# Patient Record
Sex: FEMALE | Race: WHITE
[De-identification: names, ages, dates, MRNs, and addresses within clinical notes are randomized per-mention and may not be internally consistent; named-entity substitution may affect disease eponyms.]

---

## 2020-06-06 ENCOUNTER — HOSPITAL ENCOUNTER (EMERGENCY)
Dept: HOSPITAL 97 - ER | Age: 45
Discharge: HOME | End: 2020-06-06
Payer: COMMERCIAL

## 2020-06-06 VITALS — OXYGEN SATURATION: 98 %

## 2020-06-06 VITALS — TEMPERATURE: 99 F

## 2020-06-06 VITALS — SYSTOLIC BLOOD PRESSURE: 178 MMHG | DIASTOLIC BLOOD PRESSURE: 101 MMHG

## 2020-06-06 DIAGNOSIS — Z88.2: ICD-10-CM

## 2020-06-06 DIAGNOSIS — I10: Primary | ICD-10-CM

## 2020-06-06 DIAGNOSIS — Z88.0: ICD-10-CM

## 2020-06-06 LAB
ALBUMIN SERPL BCP-MCNC: 4 G/DL (ref 3.4–5)
ALP SERPL-CCNC: 73 U/L (ref 45–117)
ALT SERPL W P-5'-P-CCNC: 28 U/L (ref 12–78)
AST SERPL W P-5'-P-CCNC: 19 U/L (ref 15–37)
BUN BLD-MCNC: 10 MG/DL (ref 7–18)
GLUCOSE SERPLBLD-MCNC: 97 MG/DL (ref 74–106)
HCT VFR BLD CALC: 45.3 % (ref 36–45)
INR BLD: 0.97
LYMPHOCYTES # SPEC AUTO: 1.6 K/UL (ref 0.7–4.9)
MAGNESIUM SERPL-MCNC: 2 MG/DL (ref 1.8–2.4)
NT-PROBNP SERPL-MCNC: 48 PG/ML (ref ?–125)
PMV BLD: 10.2 FL (ref 7.6–11.3)
POTASSIUM SERPL-SCNC: 3.6 MMOL/L (ref 3.5–5.1)
RBC # BLD: 5.03 M/UL (ref 3.86–4.86)
TROPONIN (EMERG DEPT USE ONLY): < 0.02 NG/ML (ref 0–0.04)
TSH SERPL DL<=0.05 MIU/L-ACNC: 3.26 UIU/ML (ref 0.36–3.74)

## 2020-06-06 PROCEDURE — 99284 EMERGENCY DEPT VISIT MOD MDM: CPT

## 2020-06-06 PROCEDURE — 84703 CHORIONIC GONADOTROPIN ASSAY: CPT

## 2020-06-06 PROCEDURE — 83880 ASSAY OF NATRIURETIC PEPTIDE: CPT

## 2020-06-06 PROCEDURE — 87804 INFLUENZA ASSAY W/OPTIC: CPT

## 2020-06-06 PROCEDURE — 93005 ELECTROCARDIOGRAM TRACING: CPT

## 2020-06-06 PROCEDURE — 81003 URINALYSIS AUTO W/O SCOPE: CPT

## 2020-06-06 PROCEDURE — 85025 COMPLETE CBC W/AUTO DIFF WBC: CPT

## 2020-06-06 PROCEDURE — 71045 X-RAY EXAM CHEST 1 VIEW: CPT

## 2020-06-06 PROCEDURE — 80048 BASIC METABOLIC PNL TOTAL CA: CPT

## 2020-06-06 PROCEDURE — 84443 ASSAY THYROID STIM HORMONE: CPT

## 2020-06-06 PROCEDURE — 81025 URINE PREGNANCY TEST: CPT

## 2020-06-06 PROCEDURE — 84484 ASSAY OF TROPONIN QUANT: CPT

## 2020-06-06 PROCEDURE — 83735 ASSAY OF MAGNESIUM: CPT

## 2020-06-06 PROCEDURE — 80076 HEPATIC FUNCTION PANEL: CPT

## 2020-06-06 PROCEDURE — 85379 FIBRIN DEGRADATION QUANT: CPT

## 2020-06-06 PROCEDURE — 36415 COLL VENOUS BLD VENIPUNCTURE: CPT

## 2020-06-06 PROCEDURE — 85610 PROTHROMBIN TIME: CPT

## 2020-06-06 NOTE — EDPHYS
Physician Documentation                                                                           

 [facility]                                                                 

Name: Kamala Lee                                                                                

Age: 44 yrs                                                                                       

Sex: Female                                                                                       

: 1975                                                                                   

MRN: [id number]                                                                                   

Arrival Date: 2020                                                                          

Time: 18:29                                                                                       

Account#: [id number]                                                                            

Bed 2                                                                                             

Private MD: None, None                                                                            

ED Physician Pravin Ramires                                                                      

HPI:                                                                                              

                                                                                             

19:19 This 44 yrs old  Female presents to ER via Ambulatory with complaints of High  mh7 

      Blood Pressure.                                                                             

19:19 The patient has elevated blood pressure and discovered this at home, with a home        mh7 

      device. Onset: The symptoms/episode began/occurred 2 day(s) ago. Modifying factors: The     

      symptoms are aggravated by Nothing, The symptoms are alleviated by Nothing. Associated      

      signs and symptoms: Pertinent positives: Palpitations, Pertinent negatives: chest pain,     

      dizziness, dyspnea, headache, lightheadedness, nausea, visual changes, vomiting,            

      weakness. Severity of symptoms: At its worst the blood pressure was 170 mm Hg, in the       

      emergency department the blood pressure is are actually worse, 195 mm Hg. The patient       

      has experienced similar episodes in the past, a few times.                                  

                                                                                                  

OB/GYN:                                                                                           

22:36 LMP 2020                                                                              rv  

                                                                                                  

Historical:                                                                                       

- Allergies:                                                                                      

18:39 PENICILLINS;                                                                            ll1 

18:39 Sulfa (Sulfonamide Antibiotics);                                                        ll1 

- PMHx:                                                                                           

18:39 high blood pressure during pregnancy;                                                   ll1 

- PSHx:                                                                                           

18:39 None;                                                                                   ll1 

                                                                                                  

- Immunization history:: Adult Immunizations up to date.                                          

- Social history:: Patient/guardian denies using alcohol, street drugs, tobacco                   

  products, Smoking status: unknown.                                                              

                                                                                                  

                                                                                                  

ROS:                                                                                              

19:19 Constitutional: Negative for fever, chills, and weight loss, Eyes: Negative for injury, mh7 

      pain, redness, and discharge, ENT: Negative for injury, pain, and discharge, Neck:          

      Negative for injury, pain, and swelling, Respiratory: Negative for shortness of breath,     

      cough, wheezing, and pleuritic chest pain, Abdomen/GI: Negative for abdominal pain,         

      nausea, vomiting, diarrhea, and constipation, Back: Negative for injury and pain, :       

      Negative for injury, bleeding, discharge, and swelling, MS/Extremity: Negative for          

      injury and deformity, Skin: Negative for injury, rash, and discoloration, Neuro:            

      Negative for headache, weakness, numbness, tingling, and seizure, Psych: Negative for       

      depression, anxiety, suicide ideation, homicidal ideation, and hallucinations,              

      Allergy/Immunology: Negative for hives, rash, and allergies, Endocrine: Negative for        

      neck swelling, polydipsia, polyuria, polyphagia, and marked weight changes,                 

      Hematologic/Lymphatic: Negative for swollen nodes, abnormal bleeding, and unusual           

      bruising.                                                                                   

                                                                                                  

Exam:                                                                                             

19:19 Constitutional:  This is a well developed, well nourished patient who is awake, alert,  mh7 

      and in no acute distress. Head/Face:  Normocephalic, atraumatic. Eyes:  Pupils equal        

      round and reactive to light, extra-ocular motions intact.  Lids and lashes normal.          

      Conjunctiva and sclera are non-icteric and not injected.  Cornea within normal limits.      

      Periorbital areas with no swelling, redness, or edema. Neck:  Trachea midline, no           

      thyromegaly or masses palpated, and no cervical lymphadenopathy.  Supple, full range of     

      motion without nuchal rigidity, or vertebral point tenderness.  No Meningismus.             

      Chest/axilla:  Normal chest wall appearance and motion.  Nontender with no deformity.       

      No lesions are appreciated.                                                                 

19:19 Respiratory:  Lungs have equal breath sounds bilaterally, clear to auscultation and         

      percussion.  No rales, rhonchi or wheezes noted.  No increased work of breathing, no        

      retractions or nasal flaring. Abdomen/GI:  Soft, non-tender, with normal bowel sounds.      

      No distension or tympany.  No guarding or rebound.  No evidence of tenderness               

      throughout. Back:  No spinal tenderness.  No costovertebral tenderness.  Full range of      

      motion. Skin:  Warm, dry with normal turgor.  Normal color with no rashes, no lesions,      

      and no evidence of cellulitis. MS/ Extremity:  Pulses equal, no cyanosis.                   

      Neurovascular intact.  Full, normal range of motion. Neuro:  Awake and alert, GCS 15,       

      oriented to person, place, time, and situation.  Cranial nerves II-XII grossly intact.      

      Motor strength 5/5 in all extremities.  Sensory grossly intact.  Cerebellar exam            

      normal.  Normal gait. Psych:  Awake, alert, with orientation to person, place and time.     

       Behavior, mood, and affect are within normal limits.                                       

19:19 Cardiovascular: Rate: tachycardic, Rhythm: regular, Pulses: no pulse deficits are           

      appreciated, Heart sounds: normal, normal S1and S2, Edema: is not appreciated, JVD: is      

      not appreciated.                                                                            

19:54 ECG was reviewed by the Attending Physician.                                            [facility] 

                                                                                                  

Vital Signs:                                                                                      

18:37  / 118; Pulse 118; Resp 18; Temp 100.3; Pulse Ox 97% ;                            ll1 

20:00  / 109; Pulse 97; Resp 17; Pulse Ox 99% on R/A;                                   rv  

20:14  / 113; Pulse 105; Resp 16; Pulse Ox 99% on R/A;                                  rv  

20:59  / 110; Pulse 101; Resp 14; Pulse Ox 98% ;                                        rv  

21:03 Temp 99(O);                                                                             rv  

21:48  / 111 LA Sitting; Pulse 95; Resp 16; Pulse Ox 97% on R/A;                        rv  

21:48 Pulse 85;                                                                               rv  

21:55  / 109 RA Sitting; Pulse 85; Resp 16; Pulse Ox 98% ;                              rv  

22:36  / 101; Pulse 88; Resp 15 S; Pulse Ox 98% on R/A;                                 rv  

                                                                                                  

MDM:                                                                                              

19:14 Patient medically screened.                                                             [facility] 

22:18 Differential diagnosis: hypertensive crisis, Malignant HTN, Hypertension, Palpitations, [facility] 

      Hyperthyroidism, Anxiety. Data reviewed: vital signs, nurses notes, lab test result(s),     

      cardiac enzymes, CBC, electrolytes, Flu: urinalysis, EKG, radiologic studies, plain         

      films. Data interpreted: Cardiac monitor: rate is 85 beats/min, rhythm is normal sinus      

      rhythm, regular, Interpretation: normal rate, normal rhythm, Pulse oximetry: on room        

      air is 99 %. Interpretation: normal. Counseling: I had a detailed discussion with the       

      patient and/or guardian regarding: the historical points, exam findings, and any            

      diagnostic results supporting the discharge/admit diagnosis, the presence of at least       

      one elevated blood pressure reading (>120/80) during this emergency department visit,       

      lab results, radiology results. Response to treatment: the patient's symptoms have          

      markedly improved after treatment. Refusal of service: The patient/guardian displays        

      adequate decision making capability and despite a detailed discussion of alternatives,      

      benefits, risks, and consequences refuses: CT Scan.                                         

                                                                                                  

                                                                                             

19:15 Order name: Basic Metabolic Panel                                                       [facility] 

                                                                                             

19:15 Order name: CBC with Diff; Complete Time: 20:29                                         [facility] 

                                                                                             

19:15 Order name: LFT's; Complete Time: 20:47                                                 [facility] 

                                                                                             

19:15 Order name: Magnesium; Complete Time: 20:47                                             7 

                                                                                             

19:15 Order name: NT PRO-BNP; Complete Time: 20:47                                            7 

                                                                                             

19:15 Order name: PT-INR; Complete Time: 20:29                                                7 

                                                                                             

19:15 Order name: Troponin (emerg Dept Use Only); Complete Time: 20:47                        7 

                                                                                             

19:15 Order name: TSH; Complete Time: 20:47                                                   7 

                                                                                             

19:15 Order name: Pregnancy Test, Serum; Complete Time: 20:47                                 7 

                                                                                             

19:15 Order name: Influenza Screen (a \T\ B); Complete Time: 20:29                              7

                                                                                             

19:16 Order name: Basic Metabolic Panel; Complete Time: 20:47                                 EDMS

06                                                                                             

20:19 Order name: Urine Dipstick--Ancillary (enter results); Complete Time: 21:51             mw2 

06                                                                                             

20:19 Order name: Urine Pregnancy--Ancillary (enter results); Complete Time: 21:51            2 

06                                                                                             

21:13 Order name: D-Dimer; Complete Time: 21:51                                               rv  

06                                                                                             

19:15 Order name: XRAY Chest (1 view); Complete Time: 20:29                                   7 

                                                                                             

19:15 Order name: EKG; Complete Time: 19:16                                                   [facility] 

                                                                                             

19:15 Order name: Cardiac monitoring; Complete Time: 19:50                                    7 

                                                                                             

19:15 Order name: EKG - Nurse/Tech; Complete Time: 19:50                                      [facility] 

                                                                                             

19:15 Order name: IV Saline Lock; Complete Time: 19:50                                        [facility] 

                                                                                             

19:15 Order name: Labs collected and sent; Complete Time: 19:50                               7 

                                                                                             

19:15 Order name: O2 Per Protocol; Complete Time: 19:50                                       7 

                                                                                             

19:15 Order name: O2 Sat Monitoring; Complete Time: 19:50                                     [facility] 

                                                                                             

19:15 Order name: Urine Dipstick-Ancillary (obtain specimen); Complete Time: 20:32            mh7 

                                                                                                  

EC:54 Rate is 105 beats/min. Rhythm is regular, Sinus tachycardia. QRS is negative in leads   7 

      III, aVF, V1, V2, V3. ME interval is normal. QRS interval is normal. QT interval is         

      normal. No Q waves. T waves are Normal. No ST changes noted. Clinical impression: Sinus     

      tachycardia.                                                                                

                                                                                                  

Administered Medications:                                                                         

21:20 Drug: Metoprolol 25 mg Route: PO;                                                       rv  

21:48 Follow up: Pulse 85 bpm; Response: Cardiac rhythm changed                               rv  

                                                                                                  

                                                                                                  

Disposition:                                                                                      

20 22:22 Discharged to Home. Impression: Hypertension, Palpitations.                        

- Condition is Stable.                                                                            

- Discharge Instructions: Hypertension, Palpitations, Easy-to-Read.                               

- Prescriptions for Metoprolol Tartrate 25 mg Oral Tablet - take 1 tablet by ORAL route           

  2 times per day with a meal; 20 tablet.                                                         

- Medication Reconciliation Form, Thank You Letter, Antibiotic Education, Prescription            

  Opioid Use form.                                                                                

- Follow up: MOLLY Encinas MD; When: 1 - 2 days; Reason: Worsening of condition, Recheck               

  today's complaints.                                                                             

- Problem is new.                                                                                 

- Symptoms have improved.                                                                         

                                                                                                  

                                                                                                  

                                                                                                  

Signatures:                                                                                       

Dispatcher [employer]                           EDMS                                                 

Ede Ambrosio RN                    RN   rv                                                   

Dave Ramírez RN RN   1                                                  

Pravin Ramires MD MD   7                                                  

                                                                                                  

Corrections: (The following items were deleted from the chart)                                    

19:21 19:18 This 44 yrs old  Female presents to ER via Ambulatory with complaints of mh7 

      Palpitations. mh7                                                                           

22:37 22:22 2020 22:22 Discharged to Home. Impression: Hypertension; Palpitations.      rv  

      Condition is Stable. Forms are Medication Reconciliation Form, Thank You Letter,            

      Antibiotic Education, Prescription Opioid Use. Follow up: MOLLY Encinas; When: 1 - 2 days;         

      Reason: Worsening of condition, Recheck today's complaints. Problem is new. Symptoms        

      have improved. 7                                                                          

                                                                                                  

**************************************************************************************************

## 2020-06-06 NOTE — ER
Nurse's Notes                                                                                     

 [facility]                                                                 

Name: Kamala Lee                                                                                

Age: 44 yrs                                                                                       

Sex: Female                                                                                       

: 1975                                                                                   

MRN: [id number]                                                                                   

Arrival Date: 2020                                                                          

Time: 18:29                                                                                       

Account#: [id number]                                                                            

Bed 2                                                                                             

Private MD: None, None                                                                            

Diagnosis: Hypertension;Palpitations                                                              

                                                                                                  

Presentation:                                                                                     

                                                                                             

18:37 Chief complaint: Patient states: Palpitations since Thursday night. BP at home 190/120  ll1 

      today. Coronavirus screen: Proceed with normal triage. Patient denies a cough. Patient      

      denies shortness of breath or difficulty breathing. Patient denies measured and/or          

      subjective temperature greater than 100.4F prior to today's visit. Patient denies           

      travel on a cruise ship or to a country the [facility] currently lists as an affected area.        

      Patient denies contact with known and/or suspected case of COVID-19. Ebola Screen:          

      Patient denies travel to an Ebola-affected area in the 21 days before illness onset.        

      Initial Sepsis Screen: Does the patient meet any 2 criteria? HR > 90 bpm. No. Patient's     

      initial sepsis screen is negative. Risk Assessment: Do you want to hurt yourself or         

      someone else? Patient reports no desire to harm self or others. Onset of symptoms was       

      Kassi 3, 2020.                                                                               

18:37 Method Of Arrival: Ambulatory                                                           ll1 

18:37 Acuity: EMMIE 3                                                                           ll1 

19:52 Initial Sepsis Screen: Does the patient have a suspected source of infection? No.       rv  

      Patient's initial sepsis screen is negative.                                                

                                                                                                  

OB/GYN:                                                                                           

22:36 LMP 2020                                                                              rv  

                                                                                                  

Historical:                                                                                       

- Allergies:                                                                                      

18:39 PENICILLINS;                                                                            ll1 

18:39 Sulfa (Sulfonamide Antibiotics);                                                        ll1 

- PMHx:                                                                                           

18:39 high blood pressure during pregnancy;                                                   ll1 

- PSHx:                                                                                           

18:39 None;                                                                                   ll1 

                                                                                                  

- Immunization history:: Adult Immunizations up to date.                                          

- Social history:: Patient/guardian denies using alcohol, street drugs, tobacco                   

  products, Smoking status: unknown.                                                              

                                                                                                  

                                                                                                  

Screenin:52 Abuse screen: Denies threats or abuse. Denies injuries from another. Nutritional        rv  

      screening: No deficits noted. Tuberculosis screening: No symptoms or risk factors           

      identified. Fall Risk None identified.                                                      

                                                                                                  

Assessment:                                                                                       

19:50 General: Appears comfortable, Behavior is calm, cooperative. Pain: Denies pain. Neuro:  rv  

      Level of Consciousness is awake, alert, obeys commands, Oriented to person, place,          

      time, situation. Cardiovascular: Denies chest pain, Patient's skin is warm and dry.         

      Rhythm is sinus tachycardia Chest pain is denied. Respiratory: Airway is patent             

      Respiratory effort is even, unlabored, Respiratory pattern is regular. Derm: Skin is        

      intact. Musculoskeletal: Circulation, motion, and sensation intact. Range of motion:        

      intact in all extremities, Swelling absent.                                                 

21:49 Reassessment: Patient appears in no apparent distress at this time. Patient and/or      rv  

      family updated on plan of care and expected duration. Pain level reassessed. Patient is     

      alert, oriented x 3, equal unlabored respirations, skin warm/dry/pink.                      

                                                                                                  

Vital Signs:                                                                                      

18:37  / 118; Pulse 118; Resp 18; Temp 100.3; Pulse Ox 97% ;                            ll1 

20:00  / 109; Pulse 97; Resp 17; Pulse Ox 99% on R/A;                                   rv  

20:14  / 113; Pulse 105; Resp 16; Pulse Ox 99% on R/A;                                  rv  

20:59  / 110; Pulse 101; Resp 14; Pulse Ox 98% ;                                        rv  

21:03 Temp 99(O);                                                                             rv  

21:48  / 111 LA Sitting; Pulse 95; Resp 16; Pulse Ox 97% on R/A;                        rv  

21:48 Pulse 85;                                                                               rv  

21:55  / 109 RA Sitting; Pulse 85; Resp 16; Pulse Ox 98% ;                              rv  

22:36  / 101; Pulse 88; Resp 15 S; Pulse Ox 98% on R/A;                                 rv  

                                                                                                  

ED Course:                                                                                        

18:29 Patient arrived in ED.                                                                  dp  

18:30 None, None is Private Physician.                                                        dp  

18:38 Triage completed.                                                                       ll1 

18:39 Arm band placed on Patient placed in an exam room, on a stretcher.                      ll1 

18:42 Grant Vail, RN is Primary Nurse.                                                      em  

19:00 Pravin Ramires MD is Attending Physician.                                             [id number] 

19:28 XRAY Chest (1 view) In Process Unspecified.                                             EDMS

19:50 Inserted saline lock: 22 gauge in right forearm, using aseptic technique.               rv  

19:52 Patient has correct armband on for positive identification. Placed in gown. Bed in low  rv  

      position. Call light in reach. Side rails up X 1. Cardiac monitor on. Pulse ox on. NIBP     

      on.                                                                                         

21:21 Primary Nurse role handed off by Grant Vail, JACKIE                                       rv  

21:21 Ede Ambrosio, RN is Primary Nurse.                                                  rv  

22:21 MOLLY Encinas MD is Referral Physician.                                                      [facility] 

22:37 No provider procedures requiring assistance completed. IV discontinued, intact,         rv  

      bleeding controlled, No redness/swelling at site. Pressure dressing applied.                

                                                                                                  

Administered Medications:                                                                         

21:20 Drug: Metoprolol 25 mg Route: PO;                                                       rv  

21:48 Follow up: Pulse 85 bpm; Response: Cardiac rhythm changed                               rv  

                                                                                                  

                                                                                                  

Outcome:                                                                                          

: Discharge ordered by MD.                                                                [facility] 

22:37 Discharged to home ambulatory.                                                          rv  

22:37 Condition: good                                                                             

22:37 Discharge instructions given to patient, Instructed on discharge instructions, follow       

      up and referral plans. medication usage, Demonstrated understanding of instructions,        

      follow-up care, medications, Prescriptions given X 1.                                       

22:37 Patient left the ED.                                                                    rv  

                                                                                                  

Signatures:                                                                                       

Dispatcher [employer]                           EDMS                                                 

Grant Vail RN RN                                                      

Ede Ambrosio RN RN                                                      

Agus He Lynsay, RN RN   1                                                  

Pravin Ramires MD MD   [facility]                                                  

                                                                                                  

Corrections: (The following items were deleted from the chart)                                    

19:00 18:37 Chief complaint: Patient states: Palpitation since Thursday night. BP at home     ll1 

      190/120. ll1                                                                                

21:55 21:48  / 111; Pulse 95bpm; Resp 16bpm; Pulse Ox 97% RA; rv                        rv  

                                                                                                  

**************************************************************************************************

## 2020-06-06 NOTE — RAD REPORT
EXAM DESCRIPTION:  RAD - Chest Single View - 6/6/2020 7:28 pm

 

CLINICAL HISTORY:  PALPITATIONS

 

COMPARISON:  None

 

TECHNIQUE:  AP portable chest image was obtained 6/6/2020 7:28 pm .

 

FINDINGS:  Lungs are clear. Heart and vasculature are normal. No measurable pleural effusion and no p
neumothorax. No acute bony abnormality seen. No acute aortic findings suspected.

 

IMPRESSION:  No acute cardiopulmonary process.

## 2020-06-08 NOTE — EKG
Test Date:    2020-06-06               Test Time:    19:45:57

Technician:   RV                                     

                                                     

MEASUREMENT RESULTS:                                       

Intervals:                                           

Rate:         103                                    

WI:           168                                    

QRSD:         82                                     

QT:           350                                    

QTc:          458                                    

Axis:                                                

P:            42                                     

WI:           168                                    

QRS:          -31                                    

T:            36                                     

                                                     

INTERPRETIVE STATEMENTS:                                       

                                                     

Sinus tachycardia

Left axis deviation

Abnormal ECG

No previous ECG available for comparison



Electronically Signed On 06-08-20 07:44:51 CDT by Van Davila